# Patient Record
(demographics unavailable — no encounter records)

---

## 2024-11-26 NOTE — HISTORY OF PRESENT ILLNESS
[FreeTextEntry1] : here for annual complains of pelvic pain and dyspareunia   *her dog passed away 1 year ago, still feels sad